# Patient Record
Sex: FEMALE | Race: WHITE | URBAN - METROPOLITAN AREA
[De-identification: names, ages, dates, MRNs, and addresses within clinical notes are randomized per-mention and may not be internally consistent; named-entity substitution may affect disease eponyms.]

---

## 2019-05-13 ENCOUNTER — OFFICE VISIT (OUTPATIENT)
Dept: URBAN - METROPOLITAN AREA CLINIC 33 | Facility: CLINIC | Age: 84
End: 2019-05-13
Payer: COMMERCIAL

## 2019-05-13 PROCEDURE — 99203 OFFICE O/P NEW LOW 30 MIN: CPT | Performed by: OPTOMETRIST

## 2019-05-13 PROCEDURE — 92133 CPTRZD OPH DX IMG PST SGM ON: CPT | Performed by: OPTOMETRIST

## 2019-05-13 ASSESSMENT — KERATOMETRY
OS: 42.13
OD: 43.00

## 2019-05-13 ASSESSMENT — INTRAOCULAR PRESSURE
OS: 14
OD: 14

## 2019-05-13 NOTE — IMPRESSION/PLAN
Impression: Primary open-angle glaucoma, bilateral, mild stage: H40.1131. Plan: IOPs 14/14 with Timolol QAM OU. Recommend patient continue same regimen. RTC for HVF 24-2, Pachs, and IOP check.

## 2019-05-13 NOTE — IMPRESSION/PLAN
Impression: Contact dermatitis: L25.9. Plan: Contact dermatitis, unknown etiology. Recommend patient continue Betamethasone ointment as directed by PCP. RTC in 3-4 weeks for follow up.

## 2019-08-07 ENCOUNTER — APPOINTMENT (OUTPATIENT)
Age: 84
Setting detail: DERMATOLOGY
End: 2019-08-08

## 2019-08-07 DIAGNOSIS — L90.5 SCAR CONDITIONS AND FIBROSIS OF SKIN: ICD-10-CM

## 2019-08-07 DIAGNOSIS — L92.3 FOREIGN BODY GRANULOMA OF THE SKIN AND SUBCUTANEOUS TISSUE: ICD-10-CM

## 2019-08-07 PROCEDURE — OTHER TREATMENT REGIMEN: OTHER

## 2019-08-07 PROCEDURE — OTHER COUNSELING: OTHER

## 2019-08-07 PROCEDURE — OTHER OTHER: OTHER

## 2019-08-07 PROCEDURE — 99202 OFFICE O/P NEW SF 15 MIN: CPT

## 2019-08-07 ASSESSMENT — LOCATION DETAILED DESCRIPTION DERM: LOCATION DETAILED: RIGHT LATERAL EYEBROW

## 2019-08-07 ASSESSMENT — LOCATION SIMPLE DESCRIPTION DERM: LOCATION SIMPLE: RIGHT EYEBROW

## 2019-08-07 ASSESSMENT — LOCATION ZONE DERM: LOCATION ZONE: FACE

## 2019-08-07 NOTE — PROCEDURE: OTHER
Other (Free Text): Secondary to trauma w/sutures from a fall (appx. 2 months ago)
Detail Level: Simple
Note Text (......Xxx Chief Complaint.): This diagnosis correlates with the

## 2020-01-16 ENCOUNTER — OFFICE VISIT (OUTPATIENT)
Dept: URBAN - METROPOLITAN AREA CLINIC 33 | Facility: CLINIC | Age: 85
End: 2020-01-16
Payer: COMMERCIAL

## 2020-01-16 DIAGNOSIS — L25.9 CONTACT DERMATITIS: ICD-10-CM

## 2020-01-16 PROCEDURE — 99213 OFFICE O/P EST LOW 20 MIN: CPT | Performed by: OPTOMETRIST

## 2020-01-16 RX ORDER — TRIAMCINOLONE ACETONIDE 0.25 MG/G
0.025 % OINTMENT TOPICAL
Qty: 1 | Refills: 1 | Status: INACTIVE
Start: 2020-01-16 | End: 2020-10-15

## 2020-01-16 ASSESSMENT — INTRAOCULAR PRESSURE
OS: 15
OD: 14

## 2020-01-16 NOTE — IMPRESSION/PLAN
Impression: Primary open-angle glaucoma, bilateral, mild stage: H40.1131. Plan: IOPs 14/15 with Timolol QAM OU (using since 10+years). Intraocular pressures WNL. Continue same medication regimen. RTC in 4-6 weeks for HVF 24-2, Pachs, and IOP check.

## 2020-02-17 ENCOUNTER — OFFICE VISIT (OUTPATIENT)
Dept: URBAN - METROPOLITAN AREA CLINIC 33 | Facility: CLINIC | Age: 85
End: 2020-02-17
Payer: COMMERCIAL

## 2020-02-17 PROCEDURE — 92083 EXTENDED VISUAL FIELD XM: CPT | Performed by: OPTOMETRIST

## 2020-02-17 PROCEDURE — 99213 OFFICE O/P EST LOW 20 MIN: CPT | Performed by: OPTOMETRIST

## 2020-02-17 PROCEDURE — 76514 ECHO EXAM OF EYE THICKNESS: CPT | Performed by: OPTOMETRIST

## 2020-02-17 ASSESSMENT — INTRAOCULAR PRESSURE
OD: 19
OS: 19

## 2020-02-17 NOTE — IMPRESSION/PLAN
Impression: Primary open-angle glaucoma, bilateral, mild stage: H40.1131. Patient has been using Travatan Z since ~10 years Plan: IOPS 19/19 with Travatan QAM OU and average pachs. Visual field OD: (-)defects OS: superior field defect. Recommend patient starts taking Timolol BID OU. RTC in 4 months for IOP check.

## 2020-06-18 ENCOUNTER — OFFICE VISIT (OUTPATIENT)
Dept: URBAN - METROPOLITAN AREA CLINIC 33 | Facility: CLINIC | Age: 85
End: 2020-06-18
Payer: COMMERCIAL

## 2020-06-18 PROCEDURE — 99213 OFFICE O/P EST LOW 20 MIN: CPT | Performed by: OPTOMETRIST

## 2020-06-18 ASSESSMENT — INTRAOCULAR PRESSURE
OS: 18
OD: 17

## 2020-06-18 NOTE — IMPRESSION/PLAN
Impression: Primary open-angle glaucoma, bilateral, mild stage: H40.1131. Patient has been using Timolol ~10 years Plan: IOPS 17/18 with Timolol QAM OU and average pachs. Intraocular pressures WNL. Continue using Timolol.  

RTC in 4 months for IOP check, dilation, and ONH-OCT

## 2020-10-15 ENCOUNTER — OFFICE VISIT (OUTPATIENT)
Dept: URBAN - METROPOLITAN AREA CLINIC 33 | Facility: CLINIC | Age: 85
End: 2020-10-15
Payer: COMMERCIAL

## 2020-10-15 PROCEDURE — 92133 CPTRZD OPH DX IMG PST SGM ON: CPT | Performed by: OPTOMETRIST

## 2020-10-15 PROCEDURE — 99213 OFFICE O/P EST LOW 20 MIN: CPT | Performed by: OPTOMETRIST

## 2020-10-15 RX ORDER — TIMOLOL 5.12 MG/ML
0.5 % SOLUTION/ DROPS OPHTHALMIC
Qty: 1 | Refills: 3 | Status: ACTIVE
Start: 2020-10-15

## 2020-10-15 RX ORDER — TRIAMCINOLONE ACETONIDE 0.25 MG/G
0.025 % OINTMENT TOPICAL
Qty: 1 | Refills: 1 | Status: INACTIVE
Start: 2020-10-15 | End: 2021-01-18

## 2020-10-15 ASSESSMENT — INTRAOCULAR PRESSURE
OS: 19
OD: 19

## 2020-10-15 NOTE — IMPRESSION/PLAN
Impression: Primary open-angle glaucoma, bilateral, mild stage: H40.1131. Plan: IOPs 19/19 with Timolol QAM. Pressures WNL. ONH-OCT shows OD: no defects OS: thin inferior rim, stable since 1 year. Continue the same medication regimen. RXd refill of Timolol

## 2020-10-15 NOTE — IMPRESSION/PLAN
Impression: Contact dermatitis: L25.9. Plan: Contact dermatitis, unknown etiology. Continue Triamcinolone stacie BID OU and recommend patient uses Vaseline. RXd refill Triamcinolone

## 2021-03-16 ENCOUNTER — OFFICE VISIT (OUTPATIENT)
Dept: URBAN - METROPOLITAN AREA CLINIC 33 | Facility: CLINIC | Age: 86
End: 2021-03-16
Payer: COMMERCIAL

## 2021-03-16 PROCEDURE — 99213 OFFICE O/P EST LOW 20 MIN: CPT | Performed by: OPTOMETRIST

## 2021-03-16 ASSESSMENT — INTRAOCULAR PRESSURE
OD: 18
OS: 18

## 2021-03-16 NOTE — IMPRESSION/PLAN
Impression: Primary open-angle glaucoma, bilateral, mild stage: H40.1131. Plan: IOP 18/18 w/ Timolol QAM OU. History of RNFL thinning inferiorly OS w/ cooresponding visual field defect in the left eye. IOP appears stable. Continue same treatment regimen.

## 2021-07-20 ENCOUNTER — OFFICE VISIT (OUTPATIENT)
Dept: URBAN - METROPOLITAN AREA CLINIC 33 | Facility: CLINIC | Age: 86
End: 2021-07-20
Payer: COMMERCIAL

## 2021-07-20 DIAGNOSIS — H40.1131 PRIMARY OPEN-ANGLE GLAUCOMA, BILATERAL, MILD STAGE: Primary | ICD-10-CM

## 2021-07-20 PROCEDURE — 92083 EXTENDED VISUAL FIELD XM: CPT | Performed by: OPTOMETRIST

## 2021-07-20 PROCEDURE — 99213 OFFICE O/P EST LOW 20 MIN: CPT | Performed by: OPTOMETRIST

## 2021-07-20 ASSESSMENT — INTRAOCULAR PRESSURE
OD: 17
OS: 19

## 2021-07-20 NOTE — IMPRESSION/PLAN
Impression: Primary open-angle glaucoma, bilateral, mild stage: H40.1131. Plan: IOP 17/19 with Timolol QAM OS. Pressures normal for optic nerve appearance. Visual field shows OD: scattered defects, low reliability OS: nasal step. Recommend change to twice a day for Timolol instead of once a day.  

-Start Timolol BID OS

## 2021-11-30 ENCOUNTER — OFFICE VISIT (OUTPATIENT)
Dept: URBAN - METROPOLITAN AREA CLINIC 33 | Facility: CLINIC | Age: 86
End: 2021-11-30

## 2021-11-30 PROCEDURE — 92133 CPTRZD OPH DX IMG PST SGM ON: CPT | Performed by: OPTOMETRIST

## 2021-11-30 PROCEDURE — 99213 OFFICE O/P EST LOW 20 MIN: CPT | Performed by: OPTOMETRIST

## 2021-11-30 ASSESSMENT — INTRAOCULAR PRESSURE
OD: 16
OS: 16

## 2021-11-30 NOTE — IMPRESSION/PLAN
Impression: Primary open-angle glaucoma, bilateral, mild stage: H40.1131. Plan: IOP 16/16 with Timolol BID OS. Discussed change to medication made last appointment (Timolol QD OS to BID OU). Start Timolol BID OU.

## 2022-05-05 ENCOUNTER — OFFICE VISIT (OUTPATIENT)
Dept: URBAN - METROPOLITAN AREA CLINIC 33 | Facility: CLINIC | Age: 87
End: 2022-05-05
Payer: COMMERCIAL

## 2022-05-05 DIAGNOSIS — H40.1131 PRIMARY OPEN-ANGLE GLAUCOMA, BILATERAL, MILD STAGE: Primary | ICD-10-CM

## 2022-05-05 PROCEDURE — 99213 OFFICE O/P EST LOW 20 MIN: CPT | Performed by: OPTOMETRIST

## 2022-05-05 PROCEDURE — 92083 EXTENDED VISUAL FIELD XM: CPT | Performed by: OPTOMETRIST

## 2022-05-05 ASSESSMENT — INTRAOCULAR PRESSURE
OS: 17
OD: 17

## 2022-05-05 NOTE — IMPRESSION/PLAN
Impression: Primary open-angle glaucoma, bilateral, mild stage: H40.1131. Plan: IOP 17/17 with Timolol BID OS. Visual field shows OD: early nasal step OS: nasal step/superior arcuate, stable. Encouraged patient to start Timolol BID OU instead Timolol BID OS.

## 2022-06-01 ENCOUNTER — OFFICE VISIT (OUTPATIENT)
Dept: URBAN - METROPOLITAN AREA CLINIC 33 | Facility: CLINIC | Age: 87
End: 2022-06-01
Payer: MEDICARE

## 2022-06-01 DIAGNOSIS — L25.9 CONTACT DERMATITIS: Primary | ICD-10-CM

## 2022-06-01 PROCEDURE — 99213 OFFICE O/P EST LOW 20 MIN: CPT | Performed by: OPTOMETRIST

## 2022-06-01 RX ORDER — TRIAMCINOLONE ACETONIDE 0.25 MG/G
0.025 % OINTMENT TOPICAL
Qty: 15 | Refills: 0 | Status: ACTIVE
Start: 2022-06-01

## 2022-06-01 NOTE — IMPRESSION/PLAN
Impression: Contact dermatitis: L25.9. Plan: Dermatitis of lower eyelids. Patient has used Triamcinolone Cream effectively in the past.

Start AT's QID OU. Start Triamcinolone Cream topical application over areas of redness and swelling on the lower eyelids.

## 2022-09-08 ENCOUNTER — OFFICE VISIT (OUTPATIENT)
Dept: URBAN - METROPOLITAN AREA CLINIC 33 | Facility: CLINIC | Age: 87
End: 2022-09-08
Payer: MEDICARE

## 2022-09-08 DIAGNOSIS — H52.4 PRESBYOPIA: ICD-10-CM

## 2022-09-08 DIAGNOSIS — H40.1131 PRIMARY OPEN-ANGLE GLAUCOMA, BILATERAL, MILD STAGE: Primary | ICD-10-CM

## 2022-09-08 PROCEDURE — 92133 CPTRZD OPH DX IMG PST SGM ON: CPT | Performed by: OPTOMETRIST

## 2022-09-08 PROCEDURE — 99213 OFFICE O/P EST LOW 20 MIN: CPT | Performed by: OPTOMETRIST

## 2022-09-08 ASSESSMENT — VISUAL ACUITY
OD: 20/25
OS: 20/30

## 2022-09-08 ASSESSMENT — INTRAOCULAR PRESSURE
OS: 17
OD: 17

## 2022-09-08 NOTE — IMPRESSION/PLAN
Impression: Primary open-angle glaucoma, bilateral, mild stage: H40.1131. Plan: IOP 17/17 with Timolol BID OS. Pressure WNL. ONH-OCT shows OD: healthy RNFL OS: inferior thinning, stable. Condition appears stable. Continue Timolol BID OS.

## 2022-09-08 NOTE — IMPRESSION/PLAN
Impression: Presbyopia: H52.4. Plan: Issued new glasses prescription for BCVA.  Return yearly for vision exam.

## 2022-12-20 ENCOUNTER — OFFICE VISIT (OUTPATIENT)
Dept: URBAN - METROPOLITAN AREA CLINIC 43 | Facility: CLINIC | Age: 87
End: 2022-12-20
Payer: MEDICARE

## 2022-12-20 DIAGNOSIS — H35.373 PUCKERING OF MACULA, BILATERAL: ICD-10-CM

## 2022-12-20 DIAGNOSIS — H04.123 DRY EYE SYNDROME OF BILATERAL LACRIMAL GLANDS: ICD-10-CM

## 2022-12-20 DIAGNOSIS — Z96.1 PRESENCE OF INTRAOCULAR LENS: ICD-10-CM

## 2022-12-20 DIAGNOSIS — H43.813 VITREOUS DEGENERATION, BILATERAL: ICD-10-CM

## 2022-12-20 DIAGNOSIS — H40.1131 PRIMARY OPEN-ANGLE GLAUCOMA, BILATERAL, MILD STAGE: Primary | ICD-10-CM

## 2022-12-20 PROCEDURE — 99204 OFFICE O/P NEW MOD 45 MIN: CPT | Performed by: OPTOMETRIST

## 2022-12-20 PROCEDURE — 92134 CPTRZ OPH DX IMG PST SGM RTA: CPT | Performed by: OPTOMETRIST

## 2022-12-20 RX ORDER — TIMOLOL 5.12 MG/ML
0.5 % SOLUTION/ DROPS OPHTHALMIC
Qty: 10 | Refills: 3 | Status: ACTIVE
Start: 2022-12-20

## 2022-12-20 ASSESSMENT — KERATOMETRY
OS: 42.00
OD: 42.38

## 2022-12-20 ASSESSMENT — INTRAOCULAR PRESSURE
OD: 15
OS: 15

## 2022-12-20 NOTE — IMPRESSION/PLAN
Impression: Puckering of macula, bilateral: H35.373. Plan: Observation is all that is indicated at this time. RTC immediately if any changes in vision experienced. OCT MAC done today.

## 2022-12-20 NOTE — IMPRESSION/PLAN
Impression: Primary open-angle glaucoma, bilateral, mild stage: H40.1131. Plan: Managed by Dr. Mary Aguirre IOP 15/15 with Timolol BID OU. Pressure WNL. ONH-OCT (09/2022) shows OD: healthy RNFL OS: inferior thinning, stable. Condition appears stable. Continue Timolol BID OU. 
return 6 months for IOP/VF

## 2023-01-25 ENCOUNTER — OFFICE VISIT (OUTPATIENT)
Dept: URBAN - METROPOLITAN AREA CLINIC 43 | Facility: CLINIC | Age: 88
End: 2023-01-25
Payer: MEDICARE

## 2023-01-25 DIAGNOSIS — L25.9 CONTACT DERMATITIS: Primary | ICD-10-CM

## 2023-01-25 PROCEDURE — 99213 OFFICE O/P EST LOW 20 MIN: CPT

## 2023-01-25 ASSESSMENT — INTRAOCULAR PRESSURE
OD: 17
OS: 17

## 2023-01-25 ASSESSMENT — KERATOMETRY
OS: 42.50
OD: 43.13

## 2023-03-08 ENCOUNTER — OFFICE VISIT (OUTPATIENT)
Dept: URBAN - METROPOLITAN AREA CLINIC 33 | Facility: CLINIC | Age: 88
End: 2023-03-08
Payer: MEDICARE

## 2023-03-08 DIAGNOSIS — H40.1131 PRIMARY OPEN-ANGLE GLAUCOMA, BILATERAL, MILD STAGE: Primary | ICD-10-CM

## 2023-03-08 PROCEDURE — 99213 OFFICE O/P EST LOW 20 MIN: CPT | Performed by: OPTOMETRIST

## 2023-03-08 ASSESSMENT — INTRAOCULAR PRESSURE
OD: 15
OS: 15

## 2023-03-08 NOTE — IMPRESSION/PLAN
Impression: Primary open-angle glaucoma, bilateral, mild stage: H40.1131. Plan: IOP 15/15 with Timolol QAM OU. Patient is compliant with medication and IOP is within normal range. Visual field defects in the past show mod. defect OS.

## 2023-08-12 NOTE — IMPRESSION/PLAN
0700 Pt stated on Bipap 12/5/100% R 14 for increased WOB. ABG drawn results reviewed by Dr Zurita Pollen no orders given. Pt started on CPT and Duo Nebs RR in upper 40's. 1256 Pt has coarse crackles SPO2 unable to read BP 48/30 . Pt in critical need of the bipap at this time RT did not suction per family request due to above vitals. RT will continue to monitor RN aware. Impression: Contact dermatitis: L25.9. Plan: Contact dermatitis, unknown etiology. Recommend Triamcinolone stacie BID OU.

## 2023-09-12 ENCOUNTER — OFFICE VISIT (OUTPATIENT)
Dept: URBAN - METROPOLITAN AREA CLINIC 33 | Facility: CLINIC | Age: 88
End: 2023-09-12
Payer: MEDICARE

## 2023-09-12 DIAGNOSIS — H40.1131 PRIMARY OPEN-ANGLE GLAUCOMA, BILATERAL, MILD STAGE: Primary | ICD-10-CM

## 2023-09-12 PROCEDURE — 99213 OFFICE O/P EST LOW 20 MIN: CPT

## 2023-09-12 PROCEDURE — 92083 EXTENDED VISUAL FIELD XM: CPT

## 2023-09-12 ASSESSMENT — INTRAOCULAR PRESSURE
OD: 20
OD: 17
OS: 17

## 2024-03-21 ENCOUNTER — OFFICE VISIT (OUTPATIENT)
Dept: URBAN - METROPOLITAN AREA CLINIC 33 | Facility: CLINIC | Age: 89
End: 2024-03-21
Payer: MEDICARE

## 2024-03-21 DIAGNOSIS — H04.123 DRY EYE SYNDROME OF BILATERAL LACRIMAL GLANDS: ICD-10-CM

## 2024-03-21 DIAGNOSIS — H40.1131 PRIMARY OPEN-ANGLE GLAUCOMA, BILATERAL, MILD STAGE: Primary | ICD-10-CM

## 2024-03-21 PROCEDURE — 99213 OFFICE O/P EST LOW 20 MIN: CPT

## 2024-03-21 PROCEDURE — 92133 CPTRZD OPH DX IMG PST SGM ON: CPT

## 2024-03-21 ASSESSMENT — INTRAOCULAR PRESSURE
OS: 20
OD: 19

## 2024-10-29 ENCOUNTER — OFFICE VISIT (OUTPATIENT)
Dept: URBAN - METROPOLITAN AREA CLINIC 33 | Facility: CLINIC | Age: 89
End: 2024-10-29
Payer: MEDICARE

## 2024-10-29 DIAGNOSIS — H40.1131 PRIMARY OPEN-ANGLE GLAUCOMA, BILATERAL, MILD STAGE: Primary | ICD-10-CM

## 2024-10-29 PROCEDURE — 92083 EXTENDED VISUAL FIELD XM: CPT

## 2024-10-29 PROCEDURE — 99213 OFFICE O/P EST LOW 20 MIN: CPT

## 2024-10-29 RX ORDER — TIMOLOL MALEATE 5 MG/ML
0.5 % SOLUTION/ DROPS OPHTHALMIC
Qty: 3 | Refills: 11 | Status: INACTIVE
Start: 2024-10-29 | End: 2024-11-01

## 2024-10-29 ASSESSMENT — INTRAOCULAR PRESSURE
OD: 18
OS: 20

## 2025-01-07 ENCOUNTER — OFFICE VISIT (OUTPATIENT)
Dept: URBAN - METROPOLITAN AREA CLINIC 33 | Facility: CLINIC | Age: OVER 89
End: 2025-01-07
Payer: MEDICARE

## 2025-01-07 DIAGNOSIS — H40.1131 PRIMARY OPEN-ANGLE GLAUCOMA, BILATERAL, MILD STAGE: Primary | ICD-10-CM

## 2025-01-07 DIAGNOSIS — H52.4 PRESBYOPIA: ICD-10-CM

## 2025-01-07 PROCEDURE — 99213 OFFICE O/P EST LOW 20 MIN: CPT

## 2025-01-07 PROCEDURE — 92133 CPTRZD OPH DX IMG PST SGM ON: CPT

## 2025-01-07 ASSESSMENT — VISUAL ACUITY
OD: 20/20
OS: 20/30

## 2025-01-07 ASSESSMENT — INTRAOCULAR PRESSURE
OS: 19
OD: 17

## 2025-06-09 ENCOUNTER — OFFICE VISIT (OUTPATIENT)
Dept: URBAN - METROPOLITAN AREA CLINIC 33 | Facility: CLINIC | Age: OVER 89
End: 2025-06-09
Payer: MEDICARE

## 2025-06-09 DIAGNOSIS — H40.1131 PRIMARY OPEN-ANGLE GLAUCOMA, BILATERAL, MILD STAGE: Primary | ICD-10-CM

## 2025-06-09 PROCEDURE — 99213 OFFICE O/P EST LOW 20 MIN: CPT

## 2025-06-09 PROCEDURE — 92133 CPTRZD OPH DX IMG PST SGM ON: CPT

## 2025-06-09 ASSESSMENT — INTRAOCULAR PRESSURE
OS: 17
OD: 17

## 2025-07-30 ENCOUNTER — OFFICE VISIT (OUTPATIENT)
Dept: URBAN - METROPOLITAN AREA CLINIC 33 | Facility: CLINIC | Age: OVER 89
End: 2025-07-30
Payer: MEDICARE

## 2025-07-30 DIAGNOSIS — H43.813 VITREOUS DEGENERATION, BILATERAL: Primary | ICD-10-CM

## 2025-07-30 PROCEDURE — 99213 OFFICE O/P EST LOW 20 MIN: CPT

## 2025-07-30 ASSESSMENT — INTRAOCULAR PRESSURE
OS: 21
OD: 21